# Patient Record
Sex: MALE | Race: BLACK OR AFRICAN AMERICAN | NOT HISPANIC OR LATINO | Employment: UNEMPLOYED | URBAN - METROPOLITAN AREA
[De-identification: names, ages, dates, MRNs, and addresses within clinical notes are randomized per-mention and may not be internally consistent; named-entity substitution may affect disease eponyms.]

---

## 2020-01-06 ENCOUNTER — ANCILLARY PROCEDURE (OUTPATIENT)
Dept: GENERAL RADIOLOGY | Facility: CLINIC | Age: 45
End: 2020-01-06
Attending: PHYSICIAN ASSISTANT
Payer: MEDICAID

## 2020-01-06 ENCOUNTER — OFFICE VISIT (OUTPATIENT)
Dept: URGENT CARE | Facility: URGENT CARE | Age: 45
End: 2020-01-06
Payer: MEDICAID

## 2020-01-06 VITALS
HEART RATE: 57 BPM | RESPIRATION RATE: 16 BRPM | OXYGEN SATURATION: 100 % | DIASTOLIC BLOOD PRESSURE: 76 MMHG | SYSTOLIC BLOOD PRESSURE: 124 MMHG

## 2020-01-06 DIAGNOSIS — M54.2 NECK PAIN: Primary | ICD-10-CM

## 2020-01-06 DIAGNOSIS — M79.2 RADICULAR PAIN IN RIGHT ARM: ICD-10-CM

## 2020-01-06 DIAGNOSIS — M62.830 BACK MUSCLE SPASM: ICD-10-CM

## 2020-01-06 DIAGNOSIS — M54.2 NECK PAIN: ICD-10-CM

## 2020-01-06 PROCEDURE — 72040 X-RAY EXAM NECK SPINE 2-3 VW: CPT

## 2020-01-06 PROCEDURE — 99203 OFFICE O/P NEW LOW 30 MIN: CPT | Performed by: PHYSICIAN ASSISTANT

## 2020-01-06 RX ORDER — METHYLPREDNISOLONE 4 MG
TABLET, DOSE PACK ORAL
Qty: 21 TABLET | Refills: 0 | Status: SHIPPED | OUTPATIENT
Start: 2020-01-06 | End: 2020-01-15

## 2020-01-06 RX ORDER — METHOCARBAMOL 750 MG/1
750 TABLET, FILM COATED ORAL 3 TIMES DAILY PRN
Qty: 30 TABLET | Refills: 0 | Status: SHIPPED | OUTPATIENT
Start: 2020-01-06

## 2020-01-06 NOTE — PROGRESS NOTES
"SUBJECTIVE:  Chief Complaint   Patient presents with     Arm Pain     Pt is having R arm pain that is going into the back and neck     Lebron Thomas is a 44 year old male presents with a chief complaint of right upper back , neck and right arm radicular pain .  How: no injury.  The patient complained of moderate pain  and has had decreased ROM.  Pain exacerbated by movement.  Relieved by rest.  He treated it initially with no therapy. This is the first time this type of injury has occurred to this patient.     PMH  Allergies    Allergies   Allergen Reactions     Morphine Hives     Codeine Rash     States he gets \"welts\"     Social History     Tobacco Use     Smoking status: Current Every Day Smoker     Smokeless tobacco: Never Used   Substance Use Topics     Alcohol use: Not on file     FAMILY HX  No pert fam hx    ROS:  CONSTITUTIONAL:NEGATIVE for fever, chills, change in weight  INTEGUMENTARY/SKIN: NEGATIVE for worrisome rashes, moles or lesions  EYES: NEGATIVE for vision changes or irritation  ENT/MOUTH: NEGATIVE for ear, mouth and throat problems  RESP:NEGATIVE for significant cough or SOB  CV: NEGATIVE for chest pain, palpitations or peripheral edema  GI: NEGATIVE for nausea, abdominal pain, heartburn, or change in bowel habits  : negative for and dysuria  MUSCULOSKELETAL: POSITIVE  for upper back, neck tenderness  NEURO: POSITIVE for referred pain into right upper extremity    EXAM:   /76   Pulse 57   Resp 16   SpO2 100%   Gen: healthy,alert,no distress  Extremity: DROM .   There is not compromise to the distal circulation.  Pulses are +2 and CRT is brisk  NECK:  tenderness to palpation along posterior aspect right side  CHEST: clear to auscultation  CV: regular rate and rhythm  EXTREMITIES: peripheral pulses normal  MS:  tender to palpation right side upper back and neck  SKIN: no suspicious lesions or rashes  NEURO: Normal strength and tone, sensory exam grossly normal, mentation intact and " speech normal    X-RAY was done and negative for acute changes including fracture Xray read by Louis Hough PA-C at time of visit    ASSESSMENT/PLAN      ICD-10-CM    1. Neck pain M54.2 XR Cervical Spine 2/3 Views     methylPREDNISolone (MEDROL DOSEPAK) 4 MG tablet therapy pack   2. Radicular pain in right arm M79.2 XR Cervical Spine 2/3 Views     methylPREDNISolone (MEDROL DOSEPAK) 4 MG tablet therapy pack   3. Back muscle spasm M62.830 methocarbamol (ROBAXIN) 750 MG tablet       ROM exercises  Robaxin for muscle relaxation  Medrol for radicular pain  Follow up with PCP as needed

## 2020-01-15 ENCOUNTER — OFFICE VISIT (OUTPATIENT)
Dept: URGENT CARE | Facility: URGENT CARE | Age: 45
End: 2020-01-15
Payer: MEDICAID

## 2020-01-15 ENCOUNTER — ANCILLARY PROCEDURE (OUTPATIENT)
Dept: GENERAL RADIOLOGY | Facility: CLINIC | Age: 45
End: 2020-01-15
Attending: FAMILY MEDICINE
Payer: MEDICAID

## 2020-01-15 VITALS
HEART RATE: 75 BPM | TEMPERATURE: 97.9 F | DIASTOLIC BLOOD PRESSURE: 60 MMHG | SYSTOLIC BLOOD PRESSURE: 98 MMHG | BODY MASS INDEX: 32.18 KG/M2 | RESPIRATION RATE: 16 BRPM | OXYGEN SATURATION: 100 % | WEIGHT: 224.8 LBS | HEIGHT: 70 IN

## 2020-01-15 DIAGNOSIS — M54.6 ACUTE LEFT-SIDED THORACIC BACK PAIN: Primary | ICD-10-CM

## 2020-01-15 DIAGNOSIS — R07.1 CHEST PAIN VARYING WITH BREATHING: ICD-10-CM

## 2020-01-15 DIAGNOSIS — S20.222A BACK CONTUSION, LEFT, INITIAL ENCOUNTER: ICD-10-CM
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
## 2020-01-15 PROCEDURE — 99214 OFFICE O/P EST MOD 30 MIN: CPT | Performed by: FAMILY MEDICINE

## 2020-01-15 PROCEDURE — 71101 X-RAY EXAM UNILAT RIBS/CHEST: CPT | Mod: LT

## 2020-01-15 RX ORDER — IBUPROFEN 600 MG/1
600 TABLET, FILM COATED ORAL EVERY 8 HOURS PRN
Qty: 30 TABLET | Refills: 0 | Status: SHIPPED | OUTPATIENT
Start: 2020-01-15

## 2020-01-15 ASSESSMENT — MIFFLIN-ST. JEOR: SCORE: 1910.94

## 2020-01-15 NOTE — NURSING NOTE
"Vital signs:  Temp: 97.9  F (36.6  C) Temp src: Oral BP: 98/60 Pulse: 75   Resp: 16 SpO2: 100 %     Height: 177.8 cm (5' 10\") Weight: 102 kg (224 lb 12.8 oz)  Estimated body mass index is 32.26 kg/m  as calculated from the following:    Height as of this encounter: 1.778 m (5' 10\").    Weight as of this encounter: 102 kg (224 lb 12.8 oz).          "

## 2020-01-15 NOTE — PROGRESS NOTES
"SUBJECTIVE  HPI: Lebron Thomas is a 44 year old male who presents for evaluation of back pain  Symptoms began 4 hour(s) ago, have been onset acute and are worse.  Pain is located in the upper back left region, with radiation to does not radiate,  Recent injury:fall/near fall  Personal hx of back pain is no prior back problems, he fell down the stairs as he slipped .  Pain is exacerbated by: changing position and breathing, taking deep breaths pain is getting worse   Pain is relieved by: rest[unfilled] sx include: none.  Red flag symptoms: negative.    History reviewed. No pertinent past medical history.  Current Outpatient Medications   Medication Sig Dispense Refill     ibuprofen (ADVIL/MOTRIN) 600 MG tablet Take 1 tablet (600 mg) by mouth every 8 hours as needed for moderate pain 30 tablet 0     methocarbamol (ROBAXIN) 750 MG tablet Take 1 tablet (750 mg) by mouth 3 times daily as needed for muscle spasms 30 tablet 0     Social History     Tobacco Use     Smoking status: Current Every Day Smoker     Smokeless tobacco: Never Used   Substance Use Topics     Alcohol use: Not on file       ROS:  10 point ROS of systems including Constitutional, Eyes, Respiratory, Cardiovascular, Gastroenterology, Genitourinary, Integumentary,Psychiatric were all negative except for pertinent positives noted in my HPI           OBJECTIVE:  BP 98/60   Pulse 75   Temp 97.9  F (36.6  C) (Oral)   Resp 16   Ht 1.778 m (5' 10\")   Wt 102 kg (224 lb 12.8 oz)   SpO2 100%   BMI 32.26 kg/m    Back examination: Back symmetric, no curvature. ROM normal. No CVA tenderness., positive findings: tenderness to palpation in the left left back   [unfilled] leg raise test: negative  GENERAL APPEARANCE: healthy, alert and no distress  RESP: lungs clear to auscultation - no rales, rhonchi or wheezes  CV: regular rates and rhythm, normal S1 S2, no murmur noted  ABDOMEN:  soft, nontender, no HSM or masses and bowel sounds normal  NEURO: Normal " strength and tone with no weakness or sensory deficit noted, reflexes normal   SKIN: no suspicious lesions or rashes    X-ray per my interpretation did not see any fracture or any infiltrate  ASSESSMENT/IMPRESSION:  Lebron was seen today for fall.    Diagnoses and all orders for this visit:    Acute left-sided thoracic back pain  -     XR Ribs & Chest Lt 3v  -     ibuprofen (ADVIL/MOTRIN) 600 MG tablet; Take 1 tablet (600 mg) by mouth every 8 hours as needed for moderate pain    Fall, initial encounter    Chest pain varying with breathing    Back contusion, left, initial encounter          PLAN:1) PLEASE SEE ORDER SUMMARY  [unfilled]:      1.  Continue stretching and strengthening exercises.       2.  Continue prn heat or ice application.  I did discuss with patient with x-ray findings there was no infiltrate or any  Rib   fracture noted.  Reviewed with patient it could be rib contusion.  Advised patient to do NSAIDs every 6-8 hours with food no more than 2 to 3 days and then do Tylenol for pain if symptoms do not get better over the next 48 hours should follow-up.  Patient understood and agreed with plan    Nishi Oneal MD

## 2020-01-17 ENCOUNTER — HOSPITAL ENCOUNTER (EMERGENCY)
Facility: CLINIC | Age: 45
Discharge: HOME | End: 2020-01-17
Attending: EMERGENCY MEDICINE | Admitting: EMERGENCY MEDICINE
Payer: MEDICAID

## 2020-01-17 ENCOUNTER — APPOINTMENT (OUTPATIENT)
Dept: GENERAL RADIOLOGY | Facility: CLINIC | Age: 45
End: 2020-01-17
Attending: EMERGENCY MEDICINE
Payer: MEDICAID

## 2020-01-17 VITALS
HEART RATE: 68 BPM | RESPIRATION RATE: 18 BRPM | BODY MASS INDEX: 31.36 KG/M2 | OXYGEN SATURATION: 100 % | HEIGHT: 71 IN | DIASTOLIC BLOOD PRESSURE: 72 MMHG | TEMPERATURE: 97.5 F | SYSTOLIC BLOOD PRESSURE: 115 MMHG | WEIGHT: 224 LBS

## 2020-01-17 DIAGNOSIS — S20.212A RIB CONTUSION, LEFT, INITIAL ENCOUNTER: ICD-10-CM

## 2020-01-17 PROCEDURE — 71101 X-RAY EXAM UNILAT RIBS/CHEST: CPT | Mod: LT

## 2020-01-17 PROCEDURE — 99283 EMERGENCY DEPT VISIT LOW MDM: CPT

## 2020-01-17 RX ORDER — CYCLOBENZAPRINE HCL 10 MG
10 TABLET ORAL 3 TIMES DAILY PRN
Qty: 20 TABLET | Refills: 0 | Status: SHIPPED | OUTPATIENT
Start: 2020-01-17 | End: 2020-01-23

## 2020-01-17 ASSESSMENT — ENCOUNTER SYMPTOMS
NECK PAIN: 0
SHORTNESS OF BREATH: 1
BACK PAIN: 1

## 2020-01-17 ASSESSMENT — MIFFLIN-ST. JEOR: SCORE: 1923.19

## 2020-01-17 NOTE — ED AVS SNAPSHOT
Emergency Department  [address] [place] [id number]  Phone:  [phone]  Fax:  [phone]                                    Lebron Thomas   MRN: [id number]    Department:   Emergency Department   Date of Visit:  1/17/2020           After Visit Summary Signature Page    I have received my discharge instructions, and my questions have been answered. I have discussed any challenges I see with this plan with the nurse or doctor.    ..........................................................................................................................................  Patient/Patient Representative Signature      ..........................................................................................................................................  Patient Representative Print Name and Relationship to Patient    ..................................................               ................................................  Date                                   Time    ..........................................................................................................................................  Reviewed by Signature/Title    ...................................................              ..............................................  Date                                               Time          22EPIC Rev 08/18

## 2020-01-17 NOTE — ED PROVIDER NOTES
"  History     Chief Complaint:  Back Pain    The history is provided by the patient.      Lebron Thomas is a 44 year old otherwise healthy male who presents to the emergency department today for evaluation of back pain. The patient reports that he fell yesterday on indoor stairs and today he fell again in the same place and landed on his back both times. He currently complains of back pain where he landed on his back, this is worse on his left side and he has also been having some shortness of breath. He states he fell due to not planting his foot fully on the step and notes the steps are rather narrow. He denies using alcohol at the time of the fall. The patient denies having any neck pain.     Allergies:  Morphine  Codeine     Medications:    Medications reviewed. No pertinent medications.     Past Medical History:    Medical history reviewed. No pertinent history was found.     Past Surgical History:    Surgical history reviewed. No pertinent surgical history.     Family History:    Family history reviewed. No pertinent family history.     Social History:  The patient was unaccompanied to the ED.  Smoking Status: Positive   Smokeless Tobacco: Never Used  Alcohol Use: Not currently  Drug Use: Positive, marijuana    Marital Status:  Single     Review of Systems   Respiratory: Positive for shortness of breath.    Musculoskeletal: Positive for back pain. Negative for neck pain.   10 point review of systems performed and is negative except as above and in HPI.       Physical Exam     Patient Vitals for the past 24 hrs:   BP Temp Temp src Pulse Resp SpO2 Height Weight   01/17/20 1046 115/72 97.5  F (36.4  C) Temporal 68 14 100 % 1.803 m (5' 11\") 101.6 kg (224 lb)      Physical Exam    General: Resting on the gurney, appears uncomfortable  Head:  The scalp, face, and head appear normal  Mouth/Throat: Mucus membranes are moist  Back:   No midline back tenderness to palpation. Mild left sided rib tenderness to " palpation. No deformity. No contusion.   Neck:  No midline tenderness.   CV:  Regular rate    Normal S1 and S2  No pathological murmur   Resp:  Breath sounds clear and equal bilaterally    Non-labored, no retractions or accessory muscle use    No coarseness    No wheezing   GI:  Abdomen is soft, no rigidity    No tenderness to palpation  MS:  Normal motor assessment of all extremities.    Good capillary refill noted.  Skin:  No rash or lesions noted.  Neuro:   Speech is normal and fluent. No apparent deficit.  Psych: Awake. Alert.  Normal affect.      Appropriate interactions.     Emergency Department Course     Imaging:  Radiology findings were communicated with the patient who voiced understanding of the findings.  XR, Left Ribs Unilateral 3 Views & PA Chest  No apparent left rib displaced fracture. The lungs appear clear. No apparent pneumothorax or pleural effusion.  Reading per radiology     Emergency Department Course:  1100 Nursing notes and vitals reviewed.  1101 I performed an exam of the patient as documented above. Discussed plan for x-rays.  1116 The patient was sent for an x-ray while in the emergency department, results above.     1207 Patient was rechecked and updated with imaging results and treatment plan.     Findings and plan explained to the Patient. Patient discharged home with instructions regarding supportive care, medications, and reasons to return. The importance of close follow-up was reviewed. The patient was prescribed Flexeril.     I personally reviewed the imaging results with the Patient and answered all related questions prior to discharge.      Impression & Plan      Medical Decision Making:    Lebron Thomas is a 44 year old male  who presents for evaluation of back pain after two falls at home.   Given trauma I doubt another underlying cause of chest pain. No evidence of pneumothorax, splenic or liver injury, etc.       Rib X-Ray and chest X-ray obtained and show no acute  abnormalities.  Patient informed that care for fracture and contusion of the rib are similar and that in the absence of evidence of hemo/pneumothorax supportive care with pain medication and incentive spirometry is indicated.    The patients head to toe trauma exam is otherwise normal at this time and no further trauma workup is needed as I believe there is no signs of serious head, neck, chest, spinal, extremity or abdominal injuries.      Diagnosis:    ICD-10-CM    1. Rib contusion, left, initial encounter S20.212A        Disposition:  The patient is discharged to home.     Discharge Medications:  New Prescriptions    CYCLOBENZAPRINE (FLEXERIL) 10 MG TABLET    Take 1 tablet (10 mg) by mouth 3 times daily as needed for muscle spasms       Scribe Disclosure:  Karen CULVER, am serving as a scribe at 11:01 AM on 1/17/2020 to document services personally performed by Mary Weir MD based on my observations and the provider's statements to me.    1/17/2020    EMERGENCY DEPARTMENT       Mary Weir MD  01/18/20 2773

## 2021-12-28 ENCOUNTER — OFFICE VISIT (OUTPATIENT)
Dept: URGENT CARE | Facility: URGENT CARE | Age: 46
End: 2021-12-28
Payer: MEDICAID

## 2021-12-28 VITALS
DIASTOLIC BLOOD PRESSURE: 86 MMHG | HEART RATE: 66 BPM | OXYGEN SATURATION: 100 % | BODY MASS INDEX: 28.45 KG/M2 | WEIGHT: 204 LBS | SYSTOLIC BLOOD PRESSURE: 125 MMHG

## 2021-12-28 DIAGNOSIS — N41.0 ACUTE PROSTATITIS: Primary | ICD-10-CM

## 2021-12-28 LAB
ALBUMIN UR-MCNC: NEGATIVE MG/DL
APPEARANCE UR: CLEAR
BILIRUB UR QL STRIP: NEGATIVE
COLOR UR AUTO: YELLOW
GLUCOSE UR STRIP-MCNC: NEGATIVE MG/DL
HGB UR QL STRIP: NEGATIVE
KETONES UR STRIP-MCNC: NEGATIVE MG/DL
LEUKOCYTE ESTERASE UR QL STRIP: NEGATIVE
NITRATE UR QL: NEGATIVE
PH UR STRIP: 5 [PH] (ref 5–7)
SP GR UR STRIP: <=1.005 (ref 1–1.03)
UROBILINOGEN UR STRIP-ACNC: 0.2 E.U./DL

## 2021-12-28 PROCEDURE — 87591 N.GONORRHOEAE DNA AMP PROB: CPT | Performed by: FAMILY MEDICINE

## 2021-12-28 PROCEDURE — 81003 URINALYSIS AUTO W/O SCOPE: CPT | Performed by: FAMILY MEDICINE

## 2021-12-28 PROCEDURE — 99213 OFFICE O/P EST LOW 20 MIN: CPT | Performed by: FAMILY MEDICINE

## 2021-12-28 PROCEDURE — 87491 CHLMYD TRACH DNA AMP PROBE: CPT | Performed by: FAMILY MEDICINE

## 2021-12-28 RX ORDER — GABAPENTIN 300 MG/1
CAPSULE ORAL
COMMUNITY
Start: 2021-11-12

## 2021-12-28 RX ORDER — SULFAMETHOXAZOLE/TRIMETHOPRIM 800-160 MG
1 TABLET ORAL 2 TIMES DAILY
Qty: 20 TABLET | Refills: 0 | Status: SHIPPED | OUTPATIENT
Start: 2021-12-28 | End: 2022-01-07

## 2021-12-28 NOTE — PROGRESS NOTES
"SUBJECTIVE: Lebron Thomas is a 46 year old male presenting with a chief complaint of rectal pain.  Onset of symptoms was week(s) ago.  Course of illness is worsening.      No past medical history on file.  Allergies   Allergen Reactions     Morphine Hives     Codeine Rash     States he gets \"welts\"     Social History     Tobacco Use     Smoking status: Current Every Day Smoker     Smokeless tobacco: Never Used   Substance Use Topics     Alcohol use: Not Currently       ROS:  SKIN: no rash  GI: no vomiting    OBJECTIVE:  /86 (BP Location: Right arm, Patient Position: Sitting, Cuff Size: Adult Regular)   Pulse 66   Wt 92.5 kg (204 lb)   SpO2 100%   BMI 28.45 kg/m  GENERAL APPEARANCE: healthy, alert and no distress  Rectal exam: prostate tender to palpation  SKIN: no suspicious lesions or rashes      ICD-10-CM    1. Acute prostatitis  N41.0 NEISSERIA GONORRHOEA PCR     CHLAMYDIA TRACHOMATIS PCR     UA reflex to Microscopic and Culture     sulfamethoxazole-trimethoprim (BACTRIM DS) 800-160 MG tablet       Fluids/Rest, f/u if worse/not any better    "

## 2021-12-28 NOTE — PATIENT INSTRUCTIONS
Patient Education     Prostatitis     The prostate gland is located deep inside the body at the base of the bladder. Prostatitis is an inflammation of the prostate gland. This can occur with or without infection. Most cases of prostatitis are long term (chronic). Most do not include a bacterial infection.    Chronic prostatitis is more common in older men. It is often an inflammatory condition and not an infection. But bacterial infection can also cause chronic prostatitis. It can cause pain in the rectum, urethra, bladder, or scrotum. It can also make you unable to fully empty the bladder. You may urinate often. Or you may have burning with urination. Prostatitis may also cause painful ejaculation and erectile dysfunction.    Acute prostatitis happens suddenly. This often occurs in men younger than 35. It is from a bacterial infection. You may have severe symptoms such as fever, chills, muscle aches, and pain in the area between the scrotum and anus (perineum). You may have a hard time urinating. Or you may have pain or burning when urinating. There may be blood or pus in the urine.  Your healthcare provider may do a culture test on prostate fluids or discharge from the penis. This will help figure out if bacteria are the cause. Treatment can include antibiotics, anti-inflammatory medicine, prostate medicines, and stool softeners.  Home care  These guidelines will help you care for yourself at home:    Rest at home until the fever is gone and you are feeling better.    A hot sitz bath may offer some relief. Fill a tub with 6 inches of hot water. Allow the water to run so you can keep it hot for 10 to 15 minutes.    Drink plenty of fluids. Don't drink alcohol or caffeine until all symptoms are gone.    If your healthcare gives you an antibiotic, take it exactly as you are told. Take it until it is all gone.    Constipation causes straining and pain. Prevent constipation by eating natural laxatives such as prunes,  fresh fruits, and whole-grain cereals. If needed, use a mild over-the-counter (OTC) laxative for constipation. An OTC stool softener may be used to keep the stools soft.    If sex is uncomfortable or painful, don't have sex until symptoms get better.    You may use OTC medicines for pain and fever, unless another medicine was given. If you have chronic liver or kidney disease, talk with your healthcare provider before using these medicines. Also talk with your provider if you've ever had a stomach ulcer or GI (gastrointestinal) bleeding.  Follow-up care  Follow up with your healthcare provider, a urologist, or as advised to be sure you are responding to treatment. Your healthcare provider may want to see you after you finish your antibiotics to be sure the infection has cleared. If a culture was taken, you may call for the results as directed. A culture test can help your provider know if you are on the correct antibiotic.  Call 911  Call 911if any of these occur:    Weakness, dizziness, or fainting  When to get medical advice  Call your healthcare provider right away if any of these occur:    Fever of 100.4 F (38 C) or higher, or as directed by your healthcare provider    Unable to pass urine for 8 hours    Pressure or pain in your bladder gets worse    Painful swelling of the testicle or scrotum  [employer] last reviewed this educational content on 9/1/2019 2000-2021 [employer]. All rights reserved. This information is not intended as a substitute for professional medical care. Always follow your healthcare professional's instructions.

## 2021-12-29 LAB
C TRACH DNA SPEC QL NAA+PROBE: NEGATIVE
N GONORRHOEA DNA SPEC QL NAA+PROBE: NEGATIVE

## 2022-12-11 ENCOUNTER — HOSPITAL ENCOUNTER (EMERGENCY)
Facility: CLINIC | Age: 47
Discharge: HOME | End: 2022-12-11
Attending: EMERGENCY MEDICINE | Admitting: EMERGENCY MEDICINE
Payer: MEDICAID

## 2022-12-11 VITALS
HEART RATE: 61 BPM | OXYGEN SATURATION: 100 % | WEIGHT: 207 LBS | DIASTOLIC BLOOD PRESSURE: 87 MMHG | RESPIRATION RATE: 16 BRPM | BODY MASS INDEX: 28.87 KG/M2 | TEMPERATURE: 97.1 F | SYSTOLIC BLOOD PRESSURE: 117 MMHG

## 2022-12-11 DIAGNOSIS — K60.2 ANAL FISSURE: ICD-10-CM

## 2022-12-11 DIAGNOSIS — K62.89 CHRONIC RECTAL PAIN: ICD-10-CM

## 2022-12-11 DIAGNOSIS — G89.29 CHRONIC RECTAL PAIN: ICD-10-CM

## 2022-12-11 PROCEDURE — 99284 EMERGENCY DEPT VISIT MOD MDM: CPT

## 2022-12-11 RX ORDER — POLYETHYLENE GLYCOL 3350 17 G/17G
POWDER, FOR SOLUTION ORAL
Qty: 527 G | Refills: 0 | Status: SHIPPED | OUTPATIENT
Start: 2022-12-11

## 2022-12-11 RX ORDER — LIDOCAINE HYDROCHLORIDE 20 MG/ML
JELLY TOPICAL 4 TIMES DAILY PRN
Qty: 30 ML | Refills: 0 | Status: SHIPPED | OUTPATIENT
Start: 2022-12-11

## 2022-12-11 ASSESSMENT — ENCOUNTER SYMPTOMS
ANAL BLEEDING: 1
CONSTIPATION: 0
FEVER: 0
ABDOMINAL PAIN: 1
RECTAL PAIN: 1
DIFFICULTY URINATING: 0
DYSURIA: 0

## 2022-12-11 ASSESSMENT — ACTIVITIES OF DAILY LIVING (ADL): ADLS_ACUITY_SCORE: 33

## 2022-12-11 NOTE — DISCHARGE INSTRUCTIONS
Please make an appointment to follow up with your primary care provider in 5-7 days even if entirely better.

## 2022-12-11 NOTE — ED PROVIDER NOTES
"  History   Chief Complaint:  Rectal/perineal Pain       The history is provided by the patient.      Lebron Thomas is a 47 year old male with history of GERD and dyslipidemia who presents with rectal pain beginning 2 weeks ago. He states he has had this pain since he was younger, but was suddenly exacerbated after passing large-sized stool 2 weeks ago. He notes having some bright red blood only with wiping. Further, he notes that yesterday, \"it felt like I peed myself but there was no liquid.\" He also admits to mild new \"sticking\" lower left abdominal pain. He has taken Tylenol without relief and denies taking laxatives. He denies fever, rash, and other changes in urination.    Review of Systems   Constitutional: Negative for fever.   Gastrointestinal: Positive for abdominal pain (lower left), anal bleeding and rectal pain. Negative for constipation.   Genitourinary: Negative for difficulty urinating and dysuria.   Skin: Negative for rash.   All other systems reviewed and are negative.    Allergies:  Morphine  Codeine    Medications:  Gabapentin  Robaxin    Past Medical History:     GERD  Dyslipidemia   Psychotic disorder    Social History:  The patient presents alone  The patient presents in a private vehicle     Physical Exam     Patient Vitals for the past 24 hrs:   BP Temp Temp src Pulse Resp SpO2 Weight   12/11/22 1030 (!) 119/90 97.1  F (36.2  C) Temporal 60 18 100 % 93.9 kg (207 lb)       Physical Exam    Eyes:    Conjunctiva normal  Neck:    Supple, no meningismus.     CV:     Regular rate and rhythm.      No murmurs, rubs or gallops.     No lower extremity edema.  PULM:    Clear to auscultation bilateral.       No respiratory distress.      Good air exchange.     No rales or wheezing.  ABD:    Soft, non-distended.       No reproducible abdominal tenderness.     No pulsatile masses.       No rebound, guarding or rigidity.     No CVA tenderness.   :    Small, non-tender, non-thrombosed external " hemorrhoid     Small, non-bleeding anal fissure.  MSK:     No gross deformity to all four extremities.   LYMPH:   No cervical lymphadenopathy.  NEURO:   Alert.  Good muscular tone, no atrophy.   Skin:    Warm, dry and intact.    Psych:    Mood is good and affect is appropriate.      Emergency Department Course     Emergency Department Course:    Reviewed:  I reviewed nursing notes, vitals, past medical history and Care Everywhere    Assessments:  0309 I obtained history and examined the patient as noted above.     Disposition:  The patient was discharged to home.     Impression & Plan     Medical Decision Makin-year-old male with history of chronic rectal pain presents to the ED with increased rectal pain and bleeding after passing a large bowel movement.  He has a small nonthrombosed, nonbleeding external hemorrhoid.  He has a small anal fissure as a source of his symptoms.  He is safe for discharge home with lidocaine gel.  He was also given MiraLAX to ensure soft stools.  He also remarked about mild left lower quadrant abdominal pain.  He has no reproducible abdominal tenderness.  No indication for further laboratory studies or advanced imaging.  Patient safe for discharge home and follow-up with primary physician within 1 week to ensure improvement.    Diagnosis:    ICD-10-CM    1. Chronic rectal pain  K62.89     G89.29       2. Anal fissure  K60.2           Discharge Medications:  New Prescriptions    LIDOCAINE (XYLOCAINE) 2 % EXTERNAL GEL    Apply topically 4 times daily as needed for moderate pain (4-6)    POLYETHYLENE GLYCOL (MIRALAX) 17 GM/DOSE POWDER    17 grams PO TID until stool loose then 17 grams PO daily PRN constipation       Scribe Disclosure:  Bharati CULVER, am serving as a scribe at 1:09 PM on 2022 to document services personally performed by Martinez Watson MD based on my observations and the provider's statements to me.            Martinez Watson MD  [phone]  [phone]

## 2022-12-11 NOTE — ED TRIAGE NOTES
A&O x4, ABCs intact. Pt presents with rectal pain for the past week. Pt states that he states that he has hx of hemorrhoids. Pt states prep h makes the pain worse because the rectum is sore. Pt also reports focal pain on the left side of his abdomen that hurts every now and then.        Triage Assessment     Row Name 12/11/22 1031       Triage Assessment (Adult)    Airway WDL WDL       Respiratory WDL    Respiratory WDL WDL       Cardiac WDL    Cardiac WDL WDL

## 2023-01-07 ENCOUNTER — OFFICE VISIT (OUTPATIENT)
Dept: URGENT CARE | Facility: URGENT CARE | Age: 48
End: 2023-01-07
Payer: MEDICAID

## 2023-01-07 VITALS
DIASTOLIC BLOOD PRESSURE: 78 MMHG | TEMPERATURE: 97.9 F | SYSTOLIC BLOOD PRESSURE: 122 MMHG | OXYGEN SATURATION: 97 % | BODY MASS INDEX: 27.06 KG/M2 | RESPIRATION RATE: 15 BRPM | WEIGHT: 194 LBS | HEART RATE: 54 BPM

## 2023-01-07 DIAGNOSIS — T80.89XA: Primary | ICD-10-CM

## 2023-01-07 PROCEDURE — 99212 OFFICE O/P EST SF 10 MIN: CPT | Performed by: STUDENT IN AN ORGANIZED HEALTH CARE EDUCATION/TRAINING PROGRAM

## 2023-01-07 NOTE — PROGRESS NOTES
ASSESSMENT & PLAN:   Diagnoses and all orders for this visit:  Inflammation at injection site    Localized inflammatory reaction at injection site. No signs of infection today. Discussed symptomatic treatment and symptom monitoring. Follow-up as needed.     Return in about 2 weeks (around 1/21/2023) for persistent symptoms, sooner if needed.    At the end of the encounter, I discussed results, diagnosis, medications. Discussed red flags for immediate return to clinic/ER, as well as indications for follow up if no improvement. Patient and/or caregiver understood and agreed to plan. Patient was stable for discharge.      Patient Instructions   There is no sign of infection today.   Ice area 2-3 times per day. Do not apply ice directly to skin - use a towel between skin and ice pack. Ice for 20 minutes at a time.   Monitor for redness, increased swelling, increased pain, fever and return to clinic if these develop.       ------------------------------------------------------------------------  SUBJECTIVE  Patient presents with:  Arm Pain: Patient had a shot for Schizophrenia a month ago and the site where he received the shot is swollen and painful.    HPI  Lebron Thomas is a(n) 47 year old male presenting to clinic today for swelling at injection site x 1 month. Had shot for in left deltoid 1 month ago. Since then he has had small area of swelling at injection site. Muscle sometimes feels achy. No redness, drainage, fever.     Review of Systems    Current Outpatient Medications   Medication Sig Dispense Refill     gabapentin (NEURONTIN) 300 MG capsule  (Patient not taking: Reported on 1/7/2023)       ibuprofen (ADVIL/MOTRIN) 600 MG tablet Take 1 tablet (600 mg) by mouth every 8 hours as needed for moderate pain (Patient not taking: Reported on 12/28/2021) 30 tablet 0     lidocaine (XYLOCAINE) 2 % external gel Apply topically 4 times daily as needed for moderate pain (4-6) (Patient not taking: Reported on 1/7/2023)  "30 mL 0     methocarbamol (ROBAXIN) 750 MG tablet Take 1 tablet (750 mg) by mouth 3 times daily as needed for muscle spasms (Patient not taking: Reported on 12/28/2021) 30 tablet 0     paliperidone (INVEGA TRINZA) 546 MG/1.75ML CAN intramuscular 3 month injection Inject 546 mg into the muscle every 3 months (Patient not taking: Reported on 12/28/2021)       polyethylene glycol (MIRALAX) 17 GM/Dose powder 17 grams PO TID until stool loose then 17 grams PO daily PRN constipation (Patient not taking: Reported on 1/7/2023) 527 g 0     Problem List:  There are no relevant problems documented for this patient.    Allergies   Allergen Reactions     Morphine Hives     Codeine Rash     States he gets \"welts\"         OBJECTIVE  Vitals:    01/07/23 1158   BP: 122/78   BP Location: Left arm   Patient Position: Sitting   Cuff Size: Adult Regular   Pulse: 54   Resp: 15   Temp: 97.9  F (36.6  C)   TempSrc: Tympanic   SpO2: 97%   Weight: 88 kg (194 lb)     Physical Exam   GENERAL: healthy, alert, no acute distress.   SKIN: left UE - no deformity. No erythema, warmth, tenderness. Small area of localized firm edema at lateral deltoid. No fluctuance. Normal ROM of shoulder and elbow.     No results found for any visits on 01/07/23.  "

## 2023-01-07 NOTE — PATIENT INSTRUCTIONS
There is no sign of infection today.   Ice area 2-3 times per day. Do not apply ice directly to skin - use a towel between skin and ice pack. Ice for 20 minutes at a time.   Monitor for redness, increased swelling, increased pain, fever and return to clinic if these develop.

## 2023-02-10 ENCOUNTER — HOSPITAL ENCOUNTER (EMERGENCY)
Facility: CLINIC | Age: 48
Discharge: HOME | End: 2023-02-10
Attending: EMERGENCY MEDICINE | Admitting: EMERGENCY MEDICINE
Payer: MEDICAID

## 2023-02-10 VITALS
BODY MASS INDEX: 31.1 KG/M2 | DIASTOLIC BLOOD PRESSURE: 62 MMHG | WEIGHT: 210 LBS | OXYGEN SATURATION: 100 % | TEMPERATURE: 96.5 F | RESPIRATION RATE: 16 BRPM | SYSTOLIC BLOOD PRESSURE: 109 MMHG | HEIGHT: 69 IN | HEART RATE: 79 BPM

## 2023-02-10 DIAGNOSIS — E86.0 DEHYDRATION: ICD-10-CM

## 2023-02-10 DIAGNOSIS — R42 ORTHOSTATIC LIGHTHEADEDNESS: ICD-10-CM

## 2023-02-10 LAB
ABO/RH(D): NORMAL
ANION GAP SERPL CALCULATED.3IONS-SCNC: 9 MMOL/L (ref 7–15)
ANTIBODY SCREEN: NEGATIVE
ATRIAL RATE - MUSE: 88 BPM
BASOPHILS # BLD AUTO: 0 10E3/UL (ref 0–0.2)
BASOPHILS NFR BLD AUTO: 1 %
BUN SERPL-MCNC: 13.7 MG/DL (ref 6–20)
CALCIUM SERPL-MCNC: 8.9 MG/DL (ref 8.6–10)
CHLORIDE SERPL-SCNC: 104 MMOL/L (ref 98–107)
CREAT SERPL-MCNC: 1.12 MG/DL (ref 0.67–1.17)
DEPRECATED HCO3 PLAS-SCNC: 25 MMOL/L (ref 22–29)
DIASTOLIC BLOOD PRESSURE - MUSE: NORMAL MMHG
EOSINOPHIL # BLD AUTO: 0.1 10E3/UL (ref 0–0.7)
EOSINOPHIL NFR BLD AUTO: 1 %
ERYTHROCYTE [DISTWIDTH] IN BLOOD BY AUTOMATED COUNT: 12.9 % (ref 10–15)
GFR SERPL CREATININE-BSD FRML MDRD: 81 ML/MIN/1.73M2
GLUCOSE SERPL-MCNC: 135 MG/DL (ref 70–99)
HCT VFR BLD AUTO: 48.5 % (ref 40–53)
HGB BLD-MCNC: 16.1 G/DL (ref 13.3–17.7)
IMM GRANULOCYTES # BLD: 0 10E3/UL
IMM GRANULOCYTES NFR BLD: 1 %
INTERPRETATION ECG - MUSE: NORMAL
LYMPHOCYTES # BLD AUTO: 1 10E3/UL (ref 0.8–5.3)
LYMPHOCYTES NFR BLD AUTO: 20 %
MCH RBC QN AUTO: 31.9 PG (ref 26.5–33)
MCHC RBC AUTO-ENTMCNC: 33.2 G/DL (ref 31.5–36.5)
MCV RBC AUTO: 96 FL (ref 78–100)
MONOCYTES # BLD AUTO: 0.3 10E3/UL (ref 0–1.3)
MONOCYTES NFR BLD AUTO: 6 %
NEUTROPHILS # BLD AUTO: 3.5 10E3/UL (ref 1.6–8.3)
NEUTROPHILS NFR BLD AUTO: 71 %
NRBC # BLD AUTO: 0 10E3/UL
NRBC BLD AUTO-RTO: 0 /100
P AXIS - MUSE: 59 DEGREES
PLATELET # BLD AUTO: 120 10E3/UL (ref 150–450)
POTASSIUM SERPL-SCNC: 4.6 MMOL/L (ref 3.4–5.3)
PR INTERVAL - MUSE: 140 MS
QRS DURATION - MUSE: 80 MS
QT - MUSE: 370 MS
QTC - MUSE: 447 MS
R AXIS - MUSE: 46 DEGREES
RBC # BLD AUTO: 5.04 10E6/UL (ref 4.4–5.9)
SODIUM SERPL-SCNC: 138 MMOL/L (ref 136–145)
SPECIMEN EXPIRATION DATE: NORMAL
SYSTOLIC BLOOD PRESSURE - MUSE: NORMAL MMHG
T AXIS - MUSE: 44 DEGREES
VENTRICULAR RATE- MUSE: 88 BPM
WBC # BLD AUTO: 4.9 10E3/UL (ref 4–11)

## 2023-02-10 PROCEDURE — 258N000003 HC RX IP 258 OP 636: Performed by: EMERGENCY MEDICINE

## 2023-02-10 PROCEDURE — 99284 EMERGENCY DEPT VISIT MOD MDM: CPT

## 2023-02-10 PROCEDURE — 36415 COLL VENOUS BLD VENIPUNCTURE: CPT | Performed by: EMERGENCY MEDICINE

## 2023-02-10 PROCEDURE — 96361 HYDRATE IV INFUSION ADD-ON: CPT

## 2023-02-10 PROCEDURE — 86850 RBC ANTIBODY SCREEN: CPT | Performed by: EMERGENCY MEDICINE

## 2023-02-10 PROCEDURE — 85025 COMPLETE CBC W/AUTO DIFF WBC: CPT | Performed by: EMERGENCY MEDICINE

## 2023-02-10 PROCEDURE — 86901 BLOOD TYPING SEROLOGIC RH(D): CPT | Performed by: EMERGENCY MEDICINE

## 2023-02-10 PROCEDURE — 96360 HYDRATION IV INFUSION INIT: CPT

## 2023-02-10 PROCEDURE — 93005 ELECTROCARDIOGRAM TRACING: CPT

## 2023-02-10 PROCEDURE — 80048 BASIC METABOLIC PNL TOTAL CA: CPT | Performed by: EMERGENCY MEDICINE

## 2023-02-10 RX ORDER — SODIUM CHLORIDE 9 MG/ML
INJECTION, SOLUTION INTRAVENOUS CONTINUOUS
Status: DISCONTINUED | OUTPATIENT
Start: 2023-02-10 | End: 2023-02-10 | Stop reason: HOSPADM

## 2023-02-10 RX ADMIN — SODIUM CHLORIDE 1000 ML: 9 INJECTION, SOLUTION INTRAVENOUS at 15:33

## 2023-02-10 ASSESSMENT — ENCOUNTER SYMPTOMS
VOMITING: 0
FEVER: 0
COUGH: 0
ABDOMINAL PAIN: 0
CHILLS: 0
DIARRHEA: 0
SHORTNESS OF BREATH: 0
DIZZINESS: 1

## 2023-02-10 ASSESSMENT — ACTIVITIES OF DAILY LIVING (ADL): ADLS_ACUITY_SCORE: 35

## 2023-02-10 NOTE — ED PROVIDER NOTES
"  History     Chief Complaint:  Dizziness       The history is provided by the patient.      Lebron Thomas is a 48 year old male with a history of GERD who presents with dizziness. Patient donated plasma this morning without consuming any food or liquids this morning, resulting in him becoming dizzy at work. He remarks that this has happened before. Denies chest pain, abdominal pain, shortness of breath, fever, chills, cough, vomiting or diarrhea. Smokes tobacco.       Independent Historian:   None - Patient Only    Review of External Notes: See MDM     ROS:  Review of Systems   Constitutional: Negative for chills and fever.   Respiratory: Negative for cough and shortness of breath.    Cardiovascular: Negative for chest pain.   Gastrointestinal: Negative for abdominal pain, diarrhea and vomiting.   Neurological: Positive for dizziness.   All other systems reviewed and are negative.      Allergies:  Morphine  Codeine     Medications:  Gabapentin  Robaxin     Past Medical History:     GERD  Dyslipidemia   Psychotic disorder    Social History:  Presents alone  Works at [employer]  PCP: No Ref-Primary, Physician     Physical Exam     Patient Vitals for the past 24 hrs:   BP Temp Temp src Pulse Resp SpO2 Height Weight   02/10/23 1532 -- -- -- -- 16 -- -- --   02/10/23 1530 92/64 -- -- 83 -- 100 % -- --   02/10/23 1525 97/72 -- -- 83 -- 100 % -- --   02/10/23 1515 (!) 87/76 (!) 96.5  F (35.8  C) Temporal 90 -- 100 % 1.753 m (5' 9\") 95.3 kg (210 lb)        Physical Exam  Constitutional: Well developed, nontox appearance  Head: Atraumatic.   Mouth/Throat: Oropharynx is clear and tacky  Neck:  no stridor  Eyes: no scleral icterus  Cardiovascular: RRR, 2+ bilat radial pulses  Pulmonary/Chest: nml resp effort  Abdominal: ND, soft, NT, no rebound or guarding   Ext: Warm, well perfused, no edema  Neurological: A&O, symmetric facies, moves ext x4  Skin: Skin is warm and dry.   Psychiatric: Behavior is normal. Thought content normal. "   Nursing note and vitals reviewed.    Emergency Department Course   ECG  ECG results from 02/10/23   EKG 12-lead, tracing only     Value    Systolic Blood Pressure     Diastolic Blood Pressure     Ventricular Rate 88    Atrial Rate 88    HI Interval 140    QRS Duration 80        QTc 447    P Axis 59    R AXIS 46    T Axis 44    Interpretation ECG      Sinus rhythm  Normal ECG  No previous ECGs available       Imaging:  No orders to display      Report per radiology    Laboratory:  Labs Ordered and Resulted from Time of ED Arrival to Time of ED Departure   BASIC METABOLIC PANEL - Abnormal       Result Value    Sodium 138      Potassium 4.6      Chloride 104      Carbon Dioxide (CO2) 25      Anion Gap 9      Urea Nitrogen 13.7      Creatinine 1.12      Calcium 8.9      Glucose 135 (*)     GFR Estimate 81     CBC WITH PLATELETS AND DIFFERENTIAL - Abnormal    WBC Count 4.9      RBC Count 5.04      Hemoglobin 16.1      Hematocrit 48.5      MCV 96      MCH 31.9      MCHC 33.2      RDW 12.9      Platelet Count 120 (*)     % Neutrophils 71      % Lymphocytes 20      % Monocytes 6      % Eosinophils 1      % Basophils 1      % Immature Granulocytes 1      NRBCs per 100 WBC 0      Absolute Neutrophils 3.5      Absolute Lymphocytes 1.0      Absolute Monocytes 0.3      Absolute Eosinophils 0.1      Absolute Basophils 0.0      Absolute Immature Granulocytes 0.0      Absolute NRBCs 0.0     TYPE AND SCREEN, ADULT   ABO/RH TYPE AND SCREEN        Emergency Department Course & Assessments:    Interventions:  Medications   0.9% sodium chloride BOLUS (1,000 mLs Intravenous New Bag 2/10/23 1533)     Followed by   sodium chloride 0.9% infusion (has no administration in time range)   0.9% sodium chloride BOLUS (has no administration in time range)      Independent Interpretation (X-rays, CTs, rhythm strip):  N/A     Assessments/Consultations/Discussion of Management or Tests:   ED Course as of 02/10/23 1720   Fri Feb 10, 2023    1633 I obtained history and examined the patient.      Social Determinants of Health affecting care:   See MDM.     Disposition:  The patient was discharged to home.     Impression & Plan      CMS Diagnoses: None    Medical Decision Makin year old male presenting w/ lightheadedness status post plasma donation     Social determinants affecting patient's health include: Tobacco use likely increasing risk of poor health outcomes and delayed healing     I reviewed medical records from office visit from urgent care on 2023 for an overview of the patient's previous medical history and acute complaints     DDx includes dehydration, anemia, vasovagal near syncope, orthostatic near syncope, orthostatic lightheadedness.  Doubt ACS, PE, dissection given history and physical exam.  Patient reports that he has had similar symptoms after previous plasma donations. Labs significant for mild thrombocytopenia otherwise unremarkable.  Patient was given IV fluids in the emergency department with improvement in symptoms.  Presentation most consistent with orthostatic lightheadedness secondary to dehydration due to recent plasma donation.  At this time I feel the pt is safe for discharge.  Recommendations given regarding follow up with PCP and return to the emergency department as needed for new or worsening symptoms.  Pt counseled on all results, disposition and diagnosis.  They are understanding and agreeable to plan. Patient discharged in stable condition    Diagnosis:    ICD-10-CM    1. Orthostatic lightheadedness  R42 Primary Care Referral      2. Dehydration  E86.0 Primary Care Referral           Discharge Medications:  New Prescriptions    No medications on file      Scribe Disclosure:  I, KRISTIAN JEFFERSON, am serving as a scribe at 4:27 PM on 2/10/2023 to document services personally performed by Salvador Gaspar MD based on my observations and the provider's statements to me.   2/10/2023   Salvador Gaspar,  Salvador Shukla MD  02/10/23 6277

## 2023-02-10 NOTE — ED NOTES
Bed: ED15  Expected date: 2/10/23  Expected time: 2:58 PM  Means of arrival: Ambulance  Comments:  515 48m weak, [facility] ETA 1502

## 2023-02-10 NOTE — DISCHARGE INSTRUCTIONS
Drink plenty of fluids at home.    Please stop donating plasma until you get discussed this with your primary care provider.  If you continue to donate plasma, please drink plenty of fluids and eat beforehand.

## 2023-02-10 NOTE — ED TRIAGE NOTES
Donated plasma today and went to work. At work developed lightheadedness and dizziness. Initially borderline HTN but became hypotensive with SBP 70-80 systolic.       Triage Assessment     Row Name 02/10/23 1514       Triage Assessment (Adult)    Airway WDL WDL       Respiratory WDL    Respiratory WDL WDL       Skin Circulation/Temperature WDL    Skin Circulation/Temperature WDL X       Cardiac WDL    Cardiac WDL WDL       Peripheral/Neurovascular WDL    Peripheral Neurovascular WDL WDL       Cognitive/Neuro/Behavioral WDL    Cognitive/Neuro/Behavioral WDL WDL

## 2023-12-18 ENCOUNTER — OFFICE VISIT (OUTPATIENT)
Dept: URGENT CARE | Facility: URGENT CARE | Age: 48
End: 2023-12-18
Payer: MEDICAID

## 2023-12-18 ENCOUNTER — ANCILLARY PROCEDURE (OUTPATIENT)
Dept: GENERAL RADIOLOGY | Facility: CLINIC | Age: 48
End: 2023-12-18
Attending: PHYSICIAN ASSISTANT
Payer: MEDICAID

## 2023-12-18 VITALS
DIASTOLIC BLOOD PRESSURE: 79 MMHG | OXYGEN SATURATION: 100 % | TEMPERATURE: 97.4 F | SYSTOLIC BLOOD PRESSURE: 115 MMHG | HEART RATE: 54 BPM

## 2023-12-18 DIAGNOSIS — Z11.59 NEED FOR HEPATITIS B SCREENING TEST: ICD-10-CM

## 2023-12-18 DIAGNOSIS — Z11.59 NEED FOR HEPATITIS C SCREENING TEST: ICD-10-CM

## 2023-12-18 DIAGNOSIS — T88.1XXA VACCINATION COMPLICATION, INITIAL ENCOUNTER: ICD-10-CM

## 2023-12-18 DIAGNOSIS — R07.9 ACUTE CHEST PAIN: ICD-10-CM

## 2023-12-18 DIAGNOSIS — M25.512 ACUTE PAIN OF LEFT SHOULDER: ICD-10-CM

## 2023-12-18 DIAGNOSIS — R07.9 ACUTE CHEST PAIN: Primary | ICD-10-CM

## 2023-12-18 LAB
ALBUMIN SERPL-MCNC: 3.9 G/DL (ref 3.4–5)
ALP SERPL-CCNC: 76 U/L (ref 40–150)
ALT SERPL W P-5'-P-CCNC: 12 U/L (ref 0–70)
ANION GAP SERPL CALCULATED.3IONS-SCNC: 6 MMOL/L (ref 3–14)
AST SERPL W P-5'-P-CCNC: 19 U/L (ref 0–45)
BILIRUB SERPL-MCNC: 0.8 MG/DL (ref 0.2–1.3)
BUN SERPL-MCNC: 13 MG/DL (ref 7–30)
CALCIUM SERPL-MCNC: 9.8 MG/DL (ref 8.5–10.1)
CHLORIDE BLD-SCNC: 110 MMOL/L (ref 94–109)
CO2 SERPL-SCNC: 29 MMOL/L (ref 20–32)
CREAT SERPL-MCNC: 1 MG/DL (ref 0.66–1.25)
D DIMER PPP FEU-MCNC: <0.27 UG/ML FEU (ref 0–0.5)
EGFRCR SERPLBLD CKD-EPI 2021: >90 ML/MIN/1.73M2
ERYTHROCYTE [DISTWIDTH] IN BLOOD BY AUTOMATED COUNT: 12 % (ref 10–15)
GLUCOSE BLD-MCNC: 85 MG/DL (ref 70–99)
HCT VFR BLD AUTO: 45 % (ref 40–53)
HGB BLD-MCNC: 15.4 G/DL (ref 13.3–17.7)
MCH RBC QN AUTO: 32.6 PG (ref 26.5–33)
MCHC RBC AUTO-ENTMCNC: 34.2 G/DL (ref 31.5–36.5)
MCV RBC AUTO: 95 FL (ref 78–100)
PLATELET # BLD AUTO: 130 10E3/UL (ref 150–450)
POTASSIUM BLD-SCNC: 4.4 MMOL/L (ref 3.4–5.3)
PROT SERPL-MCNC: 7.4 G/DL (ref 6.8–8.8)
RBC # BLD AUTO: 4.73 10E6/UL (ref 4.4–5.9)
SODIUM SERPL-SCNC: 145 MMOL/L (ref 133–144)
TROPONIN T SERPL HS-MCNC: <6 NG/L
WBC # BLD AUTO: 4 10E3/UL (ref 4–11)

## 2023-12-18 PROCEDURE — 99214 OFFICE O/P EST MOD 30 MIN: CPT | Mod: 25 | Performed by: PHYSICIAN ASSISTANT

## 2023-12-18 PROCEDURE — 84484 ASSAY OF TROPONIN QUANT: CPT | Performed by: PHYSICIAN ASSISTANT

## 2023-12-18 PROCEDURE — 85379 FIBRIN DEGRADATION QUANT: CPT | Performed by: PHYSICIAN ASSISTANT

## 2023-12-18 PROCEDURE — 86706 HEP B SURFACE ANTIBODY: CPT | Performed by: PHYSICIAN ASSISTANT

## 2023-12-18 PROCEDURE — 86708 HEPATITIS A ANTIBODY: CPT | Performed by: PHYSICIAN ASSISTANT

## 2023-12-18 PROCEDURE — 85027 COMPLETE CBC AUTOMATED: CPT | Performed by: PHYSICIAN ASSISTANT

## 2023-12-18 PROCEDURE — 86803 HEPATITIS C AB TEST: CPT | Performed by: PHYSICIAN ASSISTANT

## 2023-12-18 PROCEDURE — 36415 COLL VENOUS BLD VENIPUNCTURE: CPT | Performed by: PHYSICIAN ASSISTANT

## 2023-12-18 PROCEDURE — 71046 X-RAY EXAM CHEST 2 VIEWS: CPT | Mod: TC | Performed by: RADIOLOGY

## 2023-12-18 PROCEDURE — 93000 ELECTROCARDIOGRAM COMPLETE: CPT | Performed by: PHYSICIAN ASSISTANT

## 2023-12-18 PROCEDURE — 87340 HEPATITIS B SURFACE AG IA: CPT | Performed by: PHYSICIAN ASSISTANT

## 2023-12-18 PROCEDURE — 80053 COMPREHEN METABOLIC PANEL: CPT | Performed by: PHYSICIAN ASSISTANT

## 2023-12-18 RX ORDER — CELECOXIB 200 MG/1
200 CAPSULE ORAL DAILY
Qty: 10 CAPSULE | Refills: 0 | Status: SHIPPED | OUTPATIENT
Start: 2023-12-18

## 2023-12-18 RX ORDER — ACETAMINOPHEN 500 MG
500-1000 TABLET ORAL EVERY 6 HOURS PRN
Qty: 30 TABLET | Refills: 0 | Status: SHIPPED | OUTPATIENT
Start: 2023-12-18

## 2023-12-19 LAB
HAV IGG SER QL IA: REACTIVE
HBV SURFACE AB SERPL IA-ACNC: 8.62 M[IU]/ML
HBV SURFACE AB SERPL IA-ACNC: NORMAL M[IU]/ML
HBV SURFACE AG SERPL QL IA: NONREACTIVE
HCV AB SERPL QL IA: NONREACTIVE

## 2023-12-19 NOTE — PROGRESS NOTES
"  Assessment & Plan     Acute chest pain    EKG and Troponin neg for cardiac disease  EKG and ESR neg for signs of pericarditis  Chest xray Negative for acute findings, read by Louis COWART at time of visit.  D-dimer neg for PE  CBC neg elevated WBC or anemia    This appears to be more likely inflammation from his shoulder radiating  Start on medications:    - celecoxib (CELEBREX) 200 MG capsule  Dispense: 10 capsule; Refill: 0  - acetaminophen (TYLENOL) 500 MG tablet  Dispense: 30 tablet; Refill: 0    Vaccination complication, initial encounter    Patient had a vaccination in left arm from psychiatry  He Is having left shoulder, upper back and left chest wall tenderness    Start on medications  - celecoxib (CELEBREX) 200 MG capsule  Dispense: 10 capsule; Refill: 0  - acetaminophen (TYLENOL) 500 MG tablet  Dispense: 30 tablet; Refill: 0    Acute pain of left shoulder    Patient has localized tenderness left arm at injection site  Ice compresses  Start on medications  - celecoxib (CELEBREX) 200 MG capsule  Dispense: 10 capsule; Refill: 0  - acetaminophen (TYLENOL) 500 MG tablet  Dispense: 30 tablet; Refill: 0    Need for hepatitis B screening test    - Hepatitis B Surface Antibody  - Hepatitis B surface antigen  - Hepatitis B Surface Antibody  - Hepatitis B surface antigen    Need for hepatitis C screening test    - Hepatitis C antibody  - Hepatitis C antibody      Review of external notes as documented elsewhere in note     Nicotine/Tobacco Cessation:  He reports that he has been smoking. He has never used smokeless tobacco.  Nicotine/Tobacco Cessation Plan:       BMI:   Estimated body mass index is 31.01 kg/m  as calculated from the following:    Height as of 2/10/23: 1.753 m (5' 9\").    Weight as of 2/10/23: 95.3 kg (210 lb).       At today's visit with Lebron Thomas , we discussed results, diagnosis, medications and formulated a plan.  We also discussed red flags for immediate return to clinic/ER, as " well as indications for follow up with PCP if not improved in 3 days. Patient understood and agreed to plan. Lebron Thomas was discharged with stable vitals and has no further questions.       No follow-ups on file.    Louis Hough, [facility], PA-C  [facility]    Nic Diana is a 48 year old, presenting for the following health issues:  Urgent Care (Pt reports receiving Invega injection on 12/13 stating left upper arm pain radiating down arm and into chest area.He describes it as chest pain, tightness.)      HPI   Review of Systems   Constitutional, HEENT, cardiovascular, pulmonary, GI, , musculoskeletal, neuro, skin, endocrine and psych systems are negative, except as otherwise noted.      Objective    /79   Pulse 54   Temp 97.4  F (36.3  C) (Tympanic)   SpO2 100%   There is no height or weight on file to calculate BMI.  Physical Exam   GENERAL: healthy, alert and no distress  EYES: Eyes grossly normal to inspection, PERRL and conjunctivae and sclerae normal  HENT: ear canals and TM's normal, nose and mouth without ulcers or lesions  NECK: no adenopathy, no asymmetry, masses, or scars and thyroid normal to palpation  RESP: lungs clear to auscultation - no rales, rhonchi or wheezes  CV: regular rate and rhythm, normal S1 S2, no S3 or S4, no murmur, click or rub, no peripheral edema and peripheral pulses strong  ABDOMEN: soft, nontender, no hepatosplenomegaly, no masses and bowel sounds normal  MS: Pos for left shoulder tenderness  SKIN: no suspicious lesions or rashes  NEURO: Normal strength and tone, mentation intact and speech normal  PSYCH: mentation appears normal, affect normal/bright        Results for orders placed or performed in visit on 12/18/23   XR Chest 2 Views     Status: None    Narrative    EXAM: XR CHEST 2 VIEWS  LOCATION: [facility]  DATE: 12/18/2023    INDICATION:  Acute chest pain  COMPARISON: 01/15/2020      Impression     IMPRESSION: Negative chest.   Results for orders placed or performed in visit on 12/18/23   D dimer quantitative     Status: Normal   Result Value Ref Range    D-Dimer Quantitative <0.27 0.00 - 0.50 ug/mL FEU    Narrative    This D-dimer assay is intended for use in conjunction with a clinical pretest probability assessment model to exclude pulmonary embolism (PE) and deep venous thrombosis (DVT) in outpatients suspected of PE or DVT. The cut-off value is 0.50 ug/mL FEU.   CBC with platelets     Status: Abnormal   Result Value Ref Range    WBC Count 4.0 4.0 - 11.0 10e3/uL    RBC Count 4.73 4.40 - 5.90 10e6/uL    Hemoglobin 15.4 13.3 - 17.7 g/dL    Hematocrit 45.0 40.0 - 53.0 %    MCV 95 78 - 100 fL    MCH 32.6 26.5 - 33.0 pg    MCHC 34.2 31.5 - 36.5 g/dL    RDW 12.0 10.0 - 15.0 %    Platelet Count 130 (L) 150 - 450 10e3/uL   Comprehensive metabolic panel (BMP + Alb, Alk Phos, ALT, AST, Total. Bili, TP)     Status: Abnormal   Result Value Ref Range    Sodium 145 (H) 133 - 144 mmol/L    Potassium 4.4 3.4 - 5.3 mmol/L    Chloride 110 (H) 94 - 109 mmol/L    Carbon Dioxide (CO2) 29 20 - 32 mmol/L    Anion Gap 6 3 - 14 mmol/L    Urea Nitrogen 13 7 - 30 mg/dL    Creatinine 1.00 0.66 - 1.25 mg/dL    GFR Estimate >90 >60 mL/min/1.73m2    Calcium 9.8 8.5 - 10.1 mg/dL    Glucose 85 70 - 99 mg/dL    Alkaline Phosphatase 76 40 - 150 U/L    AST 19 0 - 45 U/L    ALT 12 0 - 70 U/L    Protein Total 7.4 6.8 - 8.8 g/dL    Albumin 3.9 3.4 - 5.0 g/dL    Bilirubin Total 0.8 0.2 - 1.3 mg/dL   Troponin T, High Sensitivity     Status: Normal   Result Value Ref Range    Troponin T, High Sensitivity <6 <=22 ng/L

## 2024-04-12 ENCOUNTER — APPOINTMENT (OUTPATIENT)
Dept: CT IMAGING | Facility: CLINIC | Age: 49
End: 2024-04-12
Attending: PHYSICIAN ASSISTANT
Payer: MEDICAID

## 2024-04-12 ENCOUNTER — HOSPITAL ENCOUNTER (EMERGENCY)
Facility: CLINIC | Age: 49
Discharge: HOME | End: 2024-04-12
Attending: EMERGENCY MEDICINE | Admitting: EMERGENCY MEDICINE
Payer: MEDICAID

## 2024-04-12 VITALS
RESPIRATION RATE: 16 BRPM | SYSTOLIC BLOOD PRESSURE: 118 MMHG | DIASTOLIC BLOOD PRESSURE: 90 MMHG | BODY MASS INDEX: 27.11 KG/M2 | HEIGHT: 69 IN | HEART RATE: 69 BPM | OXYGEN SATURATION: 97 % | TEMPERATURE: 98.5 F | WEIGHT: 183 LBS

## 2024-04-12 DIAGNOSIS — R19.5 PENCILLING OF STOOLS: ICD-10-CM

## 2024-04-12 DIAGNOSIS — K62.89 RECTAL PAIN: ICD-10-CM

## 2024-04-12 LAB
ALBUMIN SERPL BCG-MCNC: 4.1 G/DL (ref 3.5–5.2)
ALBUMIN UR-MCNC: NEGATIVE MG/DL
ALP SERPL-CCNC: 79 U/L (ref 40–150)
ALT SERPL W P-5'-P-CCNC: 7 U/L (ref 0–70)
ANION GAP SERPL CALCULATED.3IONS-SCNC: 11 MMOL/L (ref 7–15)
APPEARANCE UR: CLEAR
AST SERPL W P-5'-P-CCNC: 16 U/L (ref 0–45)
BASOPHILS # BLD AUTO: 0 10E3/UL (ref 0–0.2)
BASOPHILS NFR BLD AUTO: 1 %
BILIRUB SERPL-MCNC: 0.4 MG/DL
BILIRUB UR QL STRIP: NEGATIVE
BUN SERPL-MCNC: 11.1 MG/DL (ref 6–20)
CALCIUM SERPL-MCNC: 8.9 MG/DL (ref 8.6–10)
CHLORIDE SERPL-SCNC: 104 MMOL/L (ref 98–107)
COLOR UR AUTO: NORMAL
CREAT SERPL-MCNC: 0.99 MG/DL (ref 0.67–1.17)
DEPRECATED HCO3 PLAS-SCNC: 26 MMOL/L (ref 22–29)
EGFRCR SERPLBLD CKD-EPI 2021: >90 ML/MIN/1.73M2
EOSINOPHIL # BLD AUTO: 0.1 10E3/UL (ref 0–0.7)
EOSINOPHIL NFR BLD AUTO: 2 %
ERYTHROCYTE [DISTWIDTH] IN BLOOD BY AUTOMATED COUNT: 12.2 % (ref 10–15)
GLUCOSE SERPL-MCNC: 87 MG/DL (ref 70–99)
GLUCOSE UR STRIP-MCNC: NEGATIVE MG/DL
HCT VFR BLD AUTO: 41.6 % (ref 40–53)
HGB BLD-MCNC: 14.2 G/DL (ref 13.3–17.7)
HGB UR QL STRIP: NEGATIVE
IMM GRANULOCYTES # BLD: 0 10E3/UL
IMM GRANULOCYTES NFR BLD: 0 %
KETONES UR STRIP-MCNC: NEGATIVE MG/DL
LEUKOCYTE ESTERASE UR QL STRIP: NEGATIVE
LIPASE SERPL-CCNC: 12 U/L (ref 13–60)
LYMPHOCYTES # BLD AUTO: 1.2 10E3/UL (ref 0.8–5.3)
LYMPHOCYTES NFR BLD AUTO: 29 %
MCH RBC QN AUTO: 32.5 PG (ref 26.5–33)
MCHC RBC AUTO-ENTMCNC: 34.1 G/DL (ref 31.5–36.5)
MCV RBC AUTO: 95 FL (ref 78–100)
MONOCYTES # BLD AUTO: 0.2 10E3/UL (ref 0–1.3)
MONOCYTES NFR BLD AUTO: 4 %
NEUTROPHILS # BLD AUTO: 2.7 10E3/UL (ref 1.6–8.3)
NEUTROPHILS NFR BLD AUTO: 64 %
NITRATE UR QL: NEGATIVE
NRBC # BLD AUTO: 0 10E3/UL
NRBC BLD AUTO-RTO: 0 /100
PH UR STRIP: 6 [PH] (ref 5–7)
PLATELET # BLD AUTO: 141 10E3/UL (ref 150–450)
POTASSIUM SERPL-SCNC: 4.1 MMOL/L (ref 3.4–5.3)
PROT SERPL-MCNC: 6.9 G/DL (ref 6.4–8.3)
RBC # BLD AUTO: 4.37 10E6/UL (ref 4.4–5.9)
RBC URINE: <1 /HPF
SODIUM SERPL-SCNC: 141 MMOL/L (ref 135–145)
SP GR UR STRIP: 1 (ref 1–1.03)
UROBILINOGEN UR STRIP-MCNC: NORMAL MG/DL
WBC # BLD AUTO: 4.2 10E3/UL (ref 4–11)
WBC URINE: <1 /HPF

## 2024-04-12 PROCEDURE — 96360 HYDRATION IV INFUSION INIT: CPT | Performed by: EMERGENCY MEDICINE

## 2024-04-12 PROCEDURE — 99284 EMERGENCY DEPT VISIT MOD MDM: CPT | Mod: FS | Performed by: EMERGENCY MEDICINE

## 2024-04-12 PROCEDURE — 87491 CHLMYD TRACH DNA AMP PROBE: CPT | Performed by: PHYSICIAN ASSISTANT

## 2024-04-12 PROCEDURE — 83690 ASSAY OF LIPASE: CPT | Performed by: PHYSICIAN ASSISTANT

## 2024-04-12 PROCEDURE — 99285 EMERGENCY DEPT VISIT HI MDM: CPT | Mod: 25 | Performed by: EMERGENCY MEDICINE

## 2024-04-12 PROCEDURE — 81001 URINALYSIS AUTO W/SCOPE: CPT | Performed by: PHYSICIAN ASSISTANT

## 2024-04-12 PROCEDURE — 74177 CT ABD & PELVIS W/CONTRAST: CPT

## 2024-04-12 PROCEDURE — 80053 COMPREHEN METABOLIC PANEL: CPT | Performed by: PHYSICIAN ASSISTANT

## 2024-04-12 PROCEDURE — 85025 COMPLETE CBC W/AUTO DIFF WBC: CPT | Performed by: PHYSICIAN ASSISTANT

## 2024-04-12 PROCEDURE — 250N000011 HC RX IP 250 OP 636: Performed by: EMERGENCY MEDICINE

## 2024-04-12 PROCEDURE — 258N000003 HC RX IP 258 OP 636: Performed by: PHYSICIAN ASSISTANT

## 2024-04-12 PROCEDURE — 36415 COLL VENOUS BLD VENIPUNCTURE: CPT | Performed by: PHYSICIAN ASSISTANT

## 2024-04-12 PROCEDURE — 250N000009 HC RX 250: Performed by: EMERGENCY MEDICINE

## 2024-04-12 RX ORDER — IOPAMIDOL 755 MG/ML
100 INJECTION, SOLUTION INTRAVASCULAR ONCE
Status: COMPLETED | OUTPATIENT
Start: 2024-04-12 | End: 2024-04-12

## 2024-04-12 RX ADMIN — SODIUM CHLORIDE 44 ML: 9 INJECTION, SOLUTION INTRAVENOUS at 19:55

## 2024-04-12 RX ADMIN — IOPAMIDOL 90 ML: 755 INJECTION, SOLUTION INTRAVENOUS at 19:55

## 2024-04-12 RX ADMIN — SODIUM CHLORIDE 1000 ML: 9 INJECTION, SOLUTION INTRAVENOUS at 18:04

## 2024-04-12 ASSESSMENT — ACTIVITIES OF DAILY LIVING (ADL)
ADLS_ACUITY_SCORE: 35

## 2024-04-12 ASSESSMENT — COLUMBIA-SUICIDE SEVERITY RATING SCALE - C-SSRS
2. HAVE YOU ACTUALLY HAD ANY THOUGHTS OF KILLING YOURSELF IN THE PAST MONTH?: NO
1. IN THE PAST MONTH, HAVE YOU WISHED YOU WERE DEAD OR WISHED YOU COULD GO TO SLEEP AND NOT WAKE UP?: NO
6. HAVE YOU EVER DONE ANYTHING, STARTED TO DO ANYTHING, OR PREPARED TO DO ANYTHING TO END YOUR LIFE?: NO

## 2024-04-12 NOTE — ED PROVIDER NOTES
ED Provider Note  [facility]      History     Chief Complaint   Patient presents with    Rectal Problem     Patient has a history of anal fissure. Patient has had pain for the last two weeks in his anus. Bleeding every time he has a bowel movement but did have a blood clot come out without a BM. Patient is concerned for an open wound.      ISABELLA Thomas is a 49 year old male past medical history significant for chronic rectal pain history of anal fissure, history of chronic constipation on senna and docusate with a high-fiber diet, history of tobacco abuse, history of prior thrombocytopenia noted February 2023 who presents to the emergency department this evening with concerns for perirectal pain.  Patient presents alone.    He notes over the last 1 week he has been experiencing increased perirectal pain worsened his chronic pain.  He notes this is worse when he has a bowel movement.  About a week ago he noticed that he passed a long blood clot however has not had any bloody or black stool since that time.  He notes a recent bowel movement today without any bloody or black stools.  Patient states that he is also having some intermittent abdominal pain with this, no vomiting no diarrhea no fevers.  He notes he has been having some intermittent dysuria without any penile discharge or concern for STIs.    Patient states he has been taking MiraLAX over the last few days 2 capfuls a day and did take some today.  He is also using Vaseline in his perirectal area is not currently using any nifedipine or lidocaine.  He is not set to see colorectal surgery in the near future.  He is not anticoagulated.    Past Medical History  No past medical history on file.  No past surgical history on file.  lidocaine (TOPICAINE 5) 5 % anorectal gel  acetaminophen (TYLENOL) 500 MG tablet  celecoxib (CELEBREX) 200 MG capsule  gabapentin (NEURONTIN) 300 MG capsule  ibuprofen (ADVIL/MOTRIN) 600 MG  "tablet  lidocaine (XYLOCAINE) 2 % external gel  methocarbamol (ROBAXIN) 750 MG tablet  paliperidone (INVEGA TRINZA) 546 MG/1.75ML CAN intramuscular 3 month injection  polyethylene glycol (MIRALAX) 17 GM/Dose powder      Allergies   Allergen Reactions    Morphine Hives    Codeine Rash     States he gets \"welts\"     Family History  No family history on file.  Social History   Social History     Tobacco Use    Smoking status: Every Day    Smokeless tobacco: Never   Substance Use Topics    Alcohol use: Not Currently    Drug use: Yes     Frequency: 2.0 times per week     Types: Marijuana         A medically appropriate review of systems was performed with pertinent positives and negatives noted in the HPI, and all other systems negative.    Physical Exam   BP: 108/76  Pulse: 76  Temp: 98.5  F (36.9  C)  Resp: 16  Height: 167.6 cm (5' 6\")  Weight: 83 kg (183 lb)  SpO2: 99 %  Physical Exam    GENERAL APPEARANCE: The patient is well developed, well appearing, and in no acute distress.  HEAD:  Normocephalic and atraumatic.   EENT: Voice normal.  NECK: Trachea is midline.No lymphadenopathy or tenderness.  LUNGS: Breath sounds are equal and clear bilaterally. No wheezes, rhonchi, or rales.  HEART: Regular rate and normal rhythm.  Radial pulses 2+ bilaterally.  ABDOMEN: Soft, flat, and benign. No mass, tenderness, guarding, or rebound.Bowel sounds are present.  Female chaperone present for perirectal exam.  On exam patient does have palpable induration and tenderness at the 12 o'clock position just superior to the rectum.  No palpable fluctuance no expressible drainage noted.  Patient does have visible external hemorrhoids present as well without findings suggest thrombosed hemorrhoid.  PHIL elicits no palpable stool burden or mass, stool is brown in color.  No visible open skin noted in the perirectal area no obvious fissure.  : Female chaperone present.  Exam of the external genitalia shows uncircumcised male there is no " discharge noted to the urethral meatus or erythema to the glans.  No testicular tenderness or lesions.  EXTREMITIES: No cyanosis, clubbing, or edema.  NEUROLOGIC: No focal sensory or motor deficits are noted.  PSYCHIATRIC: The patient is awake, alert.  Appropriate mood and affect.  SKIN: Warm, dry, and well perfused. Good turgor.      ED Course, Procedures, & Data           Results for orders placed or performed during the hospital encounter of 04/12/24   CT Abdomen Pelvis w Contrast     Status: None    Narrative    EXAM: CT ABDOMEN PELVIS W CONTRAST  LOCATION: [facility]  DATE: 4/12/2024    INDICATION: Induration of the perirectal area 12 o'clock position evaluate for phlegmon versus abscess  COMPARISON: None.  TECHNIQUE: CT scan of the abdomen and pelvis was performed following injection of IV contrast. Multiplanar reformats were obtained. Dose reduction techniques were used.  CONTRAST: 90mL Isovue 370    FINDINGS:   LOWER CHEST: Normal.    HEPATOBILIARY: Normal.    PANCREAS: Normal.    SPLEEN: Normal.    ADRENAL GLANDS: Normal.    KIDNEYS/BLADDER: No significant mass, stone, or hydronephrosis.    BOWEL: Diverticulosis of the colon. No acute inflammatory change. No obstruction.     LYMPH NODES: Normal.    VASCULATURE: Normal.    PELVIC ORGANS: Bladder within normal limits.  No phlegmon or abscess within the rectal region or within the lower buttock.    MUSCULOSKELETAL: Normal.      Impression    IMPRESSION:   1.  No acute findings in the abdomen and pelvis. No phlegmon or abscess within the rectal region or within the lower buttock.       UA with Microscopic reflex to Culture     Status: Normal    Specimen: Urine, Clean Catch   Result Value Ref Range    Color Urine Straw Colorless, Straw, Light Yellow, Yellow    Appearance Urine Clear Clear    Glucose Urine Negative Negative mg/dL    Bilirubin Urine Negative Negative    Ketones Urine Negative Negative mg/dL    Specific  Gravity Urine 1.005 1.003 - 1.035    Blood Urine Negative Negative    pH Urine 6.0 5.0 - 7.0    Protein Albumin Urine Negative Negative mg/dL    Urobilinogen Urine Normal Normal, 2.0 mg/dL    Nitrite Urine Negative Negative    Leukocyte Esterase Urine Negative Negative    RBC Urine <1 <=2 /HPF    WBC Urine <1 <=5 /HPF    Narrative    Urine Culture not indicated   Comprehensive metabolic panel     Status: Normal   Result Value Ref Range    Sodium 141 135 - 145 mmol/L    Potassium 4.1 3.4 - 5.3 mmol/L    Carbon Dioxide (CO2) 26 22 - 29 mmol/L    Anion Gap 11 7 - 15 mmol/L    Urea Nitrogen 11.1 6.0 - 20.0 mg/dL    Creatinine 0.99 0.67 - 1.17 mg/dL    GFR Estimate >90 >60 mL/min/1.73m2    Calcium 8.9 8.6 - 10.0 mg/dL    Chloride 104 98 - 107 mmol/L    Glucose 87 70 - 99 mg/dL    Alkaline Phosphatase 79 40 - 150 U/L    AST 16 0 - 45 U/L    ALT 7 0 - 70 U/L    Protein Total 6.9 6.4 - 8.3 g/dL    Albumin 4.1 3.5 - 5.2 g/dL    Bilirubin Total 0.4 <=1.2 mg/dL   Lipase     Status: Abnormal   Result Value Ref Range    Lipase 12 (L) 13 - 60 U/L   CBC with platelets and differential     Status: Abnormal   Result Value Ref Range    WBC Count 4.2 4.0 - 11.0 10e3/uL    RBC Count 4.37 (L) 4.40 - 5.90 10e6/uL    Hemoglobin 14.2 13.3 - 17.7 g/dL    Hematocrit 41.6 40.0 - 53.0 %    MCV 95 78 - 100 fL    MCH 32.5 26.5 - 33.0 pg    MCHC 34.1 31.5 - 36.5 g/dL    RDW 12.2 10.0 - 15.0 %    Platelet Count 141 (L) 150 - 450 10e3/uL    % Neutrophils 64 %    % Lymphocytes 29 %    % Monocytes 4 %    % Eosinophils 2 %    % Basophils 1 %    % Immature Granulocytes 0 %    NRBCs per 100 WBC 0 <1 /100    Absolute Neutrophils 2.7 1.6 - 8.3 10e3/uL    Absolute Lymphocytes 1.2 0.8 - 5.3 10e3/uL    Absolute Monocytes 0.2 0.0 - 1.3 10e3/uL    Absolute Eosinophils 0.1 0.0 - 0.7 10e3/uL    Absolute Basophils 0.0 0.0 - 0.2 10e3/uL    Absolute Immature Granulocytes 0.0 <=0.4 10e3/uL    Absolute NRBCs 0.0 10e3/uL   CBC with platelets differential     Status:  Abnormal    Narrative    The following orders were created for panel order CBC with platelets differential.  Procedure                               Abnormality         Status                     ---------                               -----------         ------                     CBC with platelets and d...[[id number]]  Abnormal            Final result                 Please view results for these tests on the individual orders.     Medications   sodium chloride 0.9% BOLUS 1,000 mL (0 mLs Intravenous Stopped 4/12/24 1849)   iopamidol (ISOVUE-370) solution 100 mL (90 mLs Intravenous $Given 4/12/24 1955)   sodium chloride 0.9 % bag 100mL (44 mLs Intravenous $Given 4/12/24 1955)     Labs Ordered and Resulted from Time of ED Arrival to Time of ED Departure   LIPASE - Abnormal       Result Value    Lipase 12 (*)    CBC WITH PLATELETS AND DIFFERENTIAL - Abnormal    WBC Count 4.2      RBC Count 4.37 (*)     Hemoglobin 14.2      Hematocrit 41.6      MCV 95      MCH 32.5      MCHC 34.1      RDW 12.2      Platelet Count 141 (*)     % Neutrophils 64      % Lymphocytes 29      % Monocytes 4      % Eosinophils 2      % Basophils 1      % Immature Granulocytes 0      NRBCs per 100 WBC 0      Absolute Neutrophils 2.7      Absolute Lymphocytes 1.2      Absolute Monocytes 0.2      Absolute Eosinophils 0.1      Absolute Basophils 0.0      Absolute Immature Granulocytes 0.0      Absolute NRBCs 0.0     ROUTINE UA WITH MICROSCOPIC REFLEX TO CULTURE - Normal    Color Urine Straw      Appearance Urine Clear      Glucose Urine Negative      Bilirubin Urine Negative      Ketones Urine Negative      Specific Gravity Urine 1.005      Blood Urine Negative      pH Urine 6.0      Protein Albumin Urine Negative      Urobilinogen Urine Normal      Nitrite Urine Negative      Leukocyte Esterase Urine Negative      RBC Urine <1      WBC Urine <1     COMPREHENSIVE METABOLIC PANEL - Normal    Sodium 141      Potassium 4.1      Carbon Dioxide (CO2) 26       Anion Gap 11      Urea Nitrogen 11.1      Creatinine 0.99      GFR Estimate >90      Calcium 8.9      Chloride 104      Glucose 87      Alkaline Phosphatase 79      AST 16      ALT 7      Protein Total 6.9      Albumin 4.1      Bilirubin Total 0.4     CHLAMYDIA TRACHOMATIS/NEISSERIA GONORRHOEAE BY PCR     CT Abdomen Pelvis w Contrast   Final Result   IMPRESSION:    1.  No acute findings in the abdomen and pelvis. No phlegmon or abscess within the rectal region or within the lower buttock.                   Critical care was not performed.     Medical Decision Making  The patient's presentation was of high complexity (a chronic illness severe exacerbation, progression, or side effect of treatment).    The patient's evaluation involved:  ordering and/or review of 3+ test(s) in this encounter (see separate area of note for details)    The patient's management necessitated moderate risk (prescription drug management including medications given in the ED).    Assessment & Plan    This is a 49-year-old male with complex rectal history including history of chronic rectal pain and fissure presenting with 1 week of increased perirectal pain worse with defecation in the setting of bloody stools 1 week ago now resolved.  On presentation he has reassuring vital signs and is not febrile or tachycardic.  He has a soft abdomen.  Perirectal exam shows area of induration at the 12 o'clock position with reproducible tenderness without obvious thrombosed hemorrhoids or significant stool burden, mass.    Exam concerning for possible developing perirectal abscess versus phlegmon.  This was discussed with the patient will initiate workup including screening labs and CT abdomen pelvis with contrast.  Patient also reports some intermittent dysuria in the absence of concern for STIs will initiate UA and chlamydia gonorrhea testing.    Labs obtained reviewed.  CBC without leukocytosis or anemia.  Chemistry with normal electrolytes and  kidney function.  Lipase within reference range just against pancreatitis in the differential.  UA negative.  Chlamydia gonorrhea testing pending.  CT abdomen pelvis returns reassuring without any acute process without findings for phlegmon or abscess within the rectal region.    This was discussed with the patient.  At this point in time no indication for further intervention.  Patient's hemoglobin is stable.  He does have a history of chronic rectal pain discussed with him adding on topical lidocaine to his regimen, increasing MiraLAX, pushing fluids, and sitz baths.  Referrals were placed to colorectal surgery and primary care.  We discussed return precautions.  Patient has no other questions or concerns at this time.  Red flag signs were addressed, and they were in agreement with the patient care plan provided.    Patient seen and discussed with attending physician , who agrees with my plan of care.    I have reviewed the nursing notes. I have reviewed the findings, diagnosis, plan and need for follow up with the patient.    Discharge Medication List as of 4/12/2024  9:13 PM        START taking these medications    Details   lidocaine (TOPICAINE 5) 5 % anorectal gel Apply to perirectal area up to 6 times dailyDisp-10 g, R-0Local Print             Final diagnoses:   Rectal pain   Pencilling of stools       Brigida Myrick [facility] EMERGENCY DEPARTMENT  4/12/2024  -----------------------------------------------------------------------------------------------------  --    ED Attending Physician Attestation    I Jamia Saab MD, cared for this patient with the Advanced Practice Provider (CHENTE). I have performed a history and physical examination of the patient independent of the CHENTE. I reviewed the CHENTE's documentation above and agree with the documented findings and plan of care. I personally provided a substantive portion of the care for this patient, including the complete  Medical Decision Making. Please see the CHENTE's documentation for full details.    Summary of HPI, PE, ED Course   Patient is a 49 year old male evaluated in the emergency department for rectal pain with a long hx of rectal fissures. Exam and ED course notable for some tenderness and a somewhat firm area superior to the anus, concerning for possible developing abscess. CT scan does not show any e/o fluid collection. Discussed topical lidocaine and referral to colorectal surgery. After the completion of care in the emergency department, the patient was discharged.      Medical Decision Making    I agree with the MDM as above documented by the CHENTE.        Jamia Saab MD  Emergency Medicine        Jamia Saab MD  04/13/24 0006

## 2024-04-12 NOTE — ED TRIAGE NOTES
Patient reports rectal pain. Patient has a history of an anal fissure. Patient reports the pain started two weeks ago. Patient reports having a clot of blood come out on its own, not when having a bowel movement. Patient reports pain with sitting. Patient is concerned that he might have an open wound by his anus.  Patient reports bleeding every time he has a bowel movement.   Patient has pain in his buttocks going from sitting to standing as well.   Patient reports he gets intermittent stabbing lower back pain.

## 2024-04-13 LAB
C TRACH DNA SPEC QL PROBE+SIG AMP: NEGATIVE
N GONORRHOEA DNA SPEC QL NAA+PROBE: NEGATIVE

## 2024-04-13 NOTE — DISCHARGE INSTRUCTIONS
Here in the emergency room you have a reassuring evaluation.  Your blood work is reassuring your urine sample shows no findings for UTI.  Your CT scan fortunately shows no findings for infection around your rectum or other emergent process to explain your symptoms.  You do have some external hemorrhoids but no findings for thrombosed hemorrhoid at this time.  With her complicated history I do recommend following up with colorectal, I placed a referral and they will be calling you.  You are given prescription for lidocaine which you can use as needed we discussed increasing your MiraLAX as needed to help with constipation in addition to pushing fluids, sitz bath's, high-fiber diet.    We also discussed following up with primary care.  Referral was placed and they should also be calling you to schedule a follow-up visit.    If you develop any new or worsening symptoms including severe pain fevers return back to the emergency room for further evaluation and management.

## 2024-04-16 ENCOUNTER — TELEPHONE (OUTPATIENT)
Dept: SURGERY | Facility: CLINIC | Age: 49
End: 2024-04-16
Payer: MEDICAID

## 2024-04-16 NOTE — TELEPHONE ENCOUNTER
Unable to lvm, no myc, sent letter for the patient to call back and schedule the following:    Appointment type: New Patient  Provider: Cassidy Ramsey or Beverly Dobbins  Return date: Next available appt  Specialty phone number: [phone]  Additional appointment(s) needed: n/a  Additonal Notes: referral from Brigida Myrick for rectal pain    Left CC#

## 2024-04-23 NOTE — TELEPHONE ENCOUNTER
Diagnosis, Referred by & from: Chronic Rectal Pain, Report of Pencil Stools   Appt date: 7/10/2024   NOTES STATUS DETAILS   OFFICE NOTE from referring provider N/A    OFFICE NOTE from other specialist Care Everywhere [facility]:  3/10/22 - CR OV with Dr. Cuevas  1/14/22 - [facility] OV with Dr. Lamar  5/28/19 - CR OV with Dr. Shah   DISCHARGE SUMMARY from hospital N/A    DISCHARGE REPORT from the ER Internal [facility]:  4/12/24 - ED OV with Dr. [facility]:  12/11/22 - ED OV with Dr. Watson   OPERATIVE REPORT Care Everywhere [facility]:  9/1/10 - OP Note for BOTOX INJECTION with Dr. Shah   MEDICATION LIST Internal    LABS N/A    DIAGNOSTIC PROCEDURES     COLONOSCOPY (most recent all time after 5 years) Care Everywhere [facility]:  7/2/13 - Colonoscopy   IMAGING (DISC & REPORT)      CT Internal [facility]:  4/12/24 - CT Abd/Pelvis

## 2024-07-10 ENCOUNTER — PRE VISIT (OUTPATIENT)
Dept: SURGERY | Facility: CLINIC | Age: 49
End: 2024-07-10

## 2025-03-23 ENCOUNTER — HOSPITAL ENCOUNTER (EMERGENCY)
Facility: CLINIC | Age: 50
Discharge: HOME | End: 2025-03-23
Attending: EMERGENCY MEDICINE | Admitting: EMERGENCY MEDICINE
Payer: MEDICAID

## 2025-03-23 VITALS
HEART RATE: 62 BPM | DIASTOLIC BLOOD PRESSURE: 74 MMHG | SYSTOLIC BLOOD PRESSURE: 108 MMHG | RESPIRATION RATE: 18 BRPM | OXYGEN SATURATION: 98 % | TEMPERATURE: 97.8 F

## 2025-03-23 DIAGNOSIS — S01.111A EYEBROW LACERATION, RIGHT, INITIAL ENCOUNTER: ICD-10-CM

## 2025-03-23 PROCEDURE — 99283 EMERGENCY DEPT VISIT LOW MDM: CPT

## 2025-03-23 PROCEDURE — 12011 RPR F/E/E/N/L/M 2.5 CM/<: CPT

## 2025-03-23 ASSESSMENT — ACTIVITIES OF DAILY LIVING (ADL): ADLS_ACUITY_SCORE: 41

## 2025-03-23 NOTE — ED TRIAGE NOTES
[facility]  ED Arrival Note      Means of Arrival: EMS  Comes from: Home    Story:Pt brought in by EMS from home for facial laceration above the right eye. Pt was opening a box with a butter knife and accidentally cut himself. Pt is awake, alert and orientated X 4        EMS/PD Interventions: N/A  EMS Medications: N/A    Meets Stroke Criteria? No  Meets Trauma Criteria? No  Shock Index (HR/SBP): N/A      Directed to: Triage   Belongings: Remain with patient

## 2025-03-23 NOTE — ED PROVIDER NOTES
Emergency Department Note      History of Present Illness     Chief Complaint   Facial Laceration      HPI   Lebron Thomas is a 50 year old male who presents to the ER for evaluation of right eyebrow laceration.  Patient was reportedly opening up a box with a butter knife when his arm recoiled and the knife lacerated the right eyebrow region.  He denies vision change or eyeball pain.  Last Tdap was 2019.    Independent Historian   None    Review of External Notes       Past Medical History     Medical History and Problem List   No past medical history on file.    Medications   acetaminophen (TYLENOL) 500 MG tablet  celecoxib (CELEBREX) 200 MG capsule  gabapentin (NEURONTIN) 300 MG capsule  ibuprofen (ADVIL/MOTRIN) 600 MG tablet  lidocaine (TOPICAINE 5) 5 % anorectal gel  lidocaine (XYLOCAINE) 2 % external gel  methocarbamol (ROBAXIN) 750 MG tablet  paliperidone (INVEGA TRINZA) 546 MG/1.75ML CAN intramuscular 3 month injection  polyethylene glycol (MIRALAX) 17 GM/Dose powder      Surgical History   No past surgical history on file.    Physical Exam     Patient Vitals for the past 24 hrs:   BP Temp Pulse Resp SpO2   03/23/25 1300 108/74 97.8  F (36.6  C) 62 18 98 %     Physical Exam  VS: Reviewed per above  HENT: Mucous membranes moist.  Superficial laceration overlying the border between the right upper eyelid and eyebrow.  No evidence of laceration penetration through the eyelid itself.  No clinical signs of left eye open globe.  EYES: sclera anicteric, EOMI, PERRL. No evidence of laceration penetration through the eyelid itself.  No clinical signs of left eye open globe.  CV: Rate as noted, regular rhythm.   RESP: Effort normal. Breath sounds are normal bilaterally.  GI: no tenderness/rebound/guarding, not distended.  NEURO: Alert, moving all extremities  MSK: No deformity of the extremities  SKIN: Warm and dry    Diagnostics     Lab Results   Labs Ordered and Resulted from Time of ED Arrival to Time of ED  Departure - No data to display    Imaging   No orders to display           ED Course      Medications Administered   Medications - No data to display    Procedures   Procedures     Laceration Repair      Procedure: Laceration Repair    Indication: Laceration    Consent: Verbal    Tetanus status reviewed    Location: Right eyelid/eyebrow    Length: 1 cm    Preparation: Irrigation with Sterile Saline.    Anesthesia/Sedation: none      Treatment/Exploration: Wound explored, no foreign bodies found     Closure: The wound was closed with Tissue Adhesive.    Patient Status: The patient tolerated the procedure well: Yes. There were no complications.    Discussion of Management   None    ED Course        Additional Documentation  None    Medical Decision Making / Diagnosis     CMS Diagnoses: None    MIPS       None    MDM   Lebron Thomas is a 50 year old male who presents to the ER for evaluation of laceration between the right eyebrow and eyelid.  Vital signs reassuring.  On exam the laceration is quite superficial and does not penetrate below skin surface/subcutaneous tissue.  No signs of open globe or ocular injury either.  A small amount of tissue adhesive was placed over the laceration per procedure note above.  Return precautions discussed prior to discharge.    Disposition   The patient was discharged.     Diagnosis     ICD-10-CM    1. Eyebrow laceration, right, initial encounter  S01.111A            Discharge Medications   Discharge Medication List as of 3/23/2025  1:27 PM                   Arnoldo Perez MD  03/23/25 3163